# Patient Record
Sex: MALE | Race: WHITE | Employment: OTHER | URBAN - METROPOLITAN AREA
[De-identification: names, ages, dates, MRNs, and addresses within clinical notes are randomized per-mention and may not be internally consistent; named-entity substitution may affect disease eponyms.]

---

## 2020-08-04 ENCOUNTER — HOSPITAL ENCOUNTER (EMERGENCY)
Age: 55
Discharge: HOME | End: 2020-08-04
Attending: EMERGENCY MEDICINE
Payer: MEDICARE

## 2020-08-04 ENCOUNTER — APPOINTMENT (OUTPATIENT)
Dept: CT IMAGING | Age: 55
End: 2020-08-04
Attending: EMERGENCY MEDICINE
Payer: MEDICARE

## 2020-08-04 ENCOUNTER — APPOINTMENT (OUTPATIENT)
Dept: GENERAL RADIOLOGY | Age: 55
End: 2020-08-04
Attending: EMERGENCY MEDICINE
Payer: MEDICARE

## 2020-08-04 VITALS
DIASTOLIC BLOOD PRESSURE: 80 MMHG | TEMPERATURE: 97.4 F | WEIGHT: 189.6 LBS | HEART RATE: 60 BPM | SYSTOLIC BLOOD PRESSURE: 120 MMHG | OXYGEN SATURATION: 98 % | BODY MASS INDEX: 24.33 KG/M2 | HEIGHT: 74 IN | RESPIRATION RATE: 16 BRPM

## 2020-08-04 DIAGNOSIS — S70.02XA CONTUSION OF LEFT HIP, INITIAL ENCOUNTER: ICD-10-CM

## 2020-08-04 DIAGNOSIS — S40.022A CONTUSION OF MULTIPLE SITES OF LEFT SHOULDER AND UPPER ARM, INITIAL ENCOUNTER: ICD-10-CM

## 2020-08-04 DIAGNOSIS — S06.0X1A CONCUSSION WITH LOSS OF CONSCIOUSNESS OF 30 MINUTES OR LESS, INITIAL ENCOUNTER: ICD-10-CM

## 2020-08-04 DIAGNOSIS — S40.012A CONTUSION OF MULTIPLE SITES OF LEFT SHOULDER AND UPPER ARM, INITIAL ENCOUNTER: ICD-10-CM
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
## 2020-08-04 DIAGNOSIS — S01.01XA LACERATION OF SCALP, INITIAL ENCOUNTER: ICD-10-CM

## 2020-08-04 PROCEDURE — 96372 THER/PROPH/DIAG INJ SC/IM: CPT

## 2020-08-04 PROCEDURE — 73552 X-RAY EXAM OF FEMUR 2/>: CPT

## 2020-08-04 PROCEDURE — 75810000293 HC SIMP/SUPERF WND  RPR

## 2020-08-04 PROCEDURE — 74011250636 HC RX REV CODE- 250/636: Performed by: EMERGENCY MEDICINE

## 2020-08-04 PROCEDURE — 70450 CT HEAD/BRAIN W/O DYE: CPT

## 2020-08-04 PROCEDURE — 74011250637 HC RX REV CODE- 250/637: Performed by: EMERGENCY MEDICINE

## 2020-08-04 PROCEDURE — 99283 EMERGENCY DEPT VISIT LOW MDM: CPT

## 2020-08-04 RX ORDER — MORPHINE SULFATE 4 MG/ML
4 INJECTION INTRAVENOUS
Status: COMPLETED | OUTPATIENT
Start: 2020-08-04 | End: 2020-08-04

## 2020-08-04 RX ORDER — OXYCODONE AND ACETAMINOPHEN 5; 325 MG/1; MG/1
1 TABLET ORAL
Qty: 15 TAB | Refills: 0 | Status: SHIPPED | OUTPATIENT
Start: 2020-08-04 | End: 2020-08-09

## 2020-08-04 RX ORDER — OXYCODONE AND ACETAMINOPHEN 5; 325 MG/1; MG/1
1 TABLET ORAL
Status: COMPLETED | OUTPATIENT
Start: 2020-08-04 | End: 2020-08-04

## 2020-08-04 RX ADMIN — OXYCODONE HYDROCHLORIDE AND ACETAMINOPHEN 1 TABLET: 5; 325 TABLET ORAL at 02:25

## 2020-08-04 RX ADMIN — MORPHINE SULFATE 4 MG: 4 INJECTION INTRAVENOUS at 00:55

## 2020-08-04 NOTE — ED TRIAGE NOTES
Pt states that he fell off his step stool. Laceration to the top of the scalp. Bleeding controlled at the present time. States that he did have LOC and is very tired at the present time.   Masked on arrival to the ED

## 2020-08-04 NOTE — DISCHARGE INSTRUCTIONS
No strenuous activity or exposure to heat for the next 48 hours. Take the pain medication only as needed for moderate to severe pain. Use Tylenol or Motrin as needed for mild to moderate pain. Staple removal in 10 to 14 days. Either return to the emergency department or see your family doctor to have them taken out. Risk of opioid analgesics include, but are not limited to: Overdose they can stop or slow your breathing and lead to death; fractures from falls; drowsiness leading to injury; tolerance, dependence and addiction. You should not operate any motorized vehicles or work from a height greater than ground level when taking opioid analgesics as they increase your fall risks.

## 2020-08-04 NOTE — ED NOTES
I have reviewed discharge instructions with the patient. The patient verbalized understanding. Patient left ED via Discharge Method: ambulatory to Home with friend. Opportunity for questions and clarification provided. Patient given 1 scripts. To continue your aftercare when you leave the hospital, you may receive an automated call from our care team to check in on how you are doing. This is a free service and part of our promise to provide the best care and service to meet your aftercare needs.  If you have questions, or wish to unsubscribe from this service please call [phone]. Thank you for Choosing our [address] [place] [address] Emergency Department.

## 2020-08-04 NOTE — ED PROVIDER NOTES
Patient is a 57-year-old male presenting emerge department day after falling off of the stepstool. Patient states he was changing a porch and is coming down off a step stool when he lost his balance causing him to fall headfirst into a brick. Patient said that he did have loss of consciousness for an unknown amount of time. His girlfriend was present but did not witness the injury. He is now complaining of some left shoulder and left hip pain. Patient states that he has known history of back problems with multiple back surgeries secondary to herniated disc. He denies any increased or unexpected pain in the back at this time. The patient says he just feels very lightheaded and sleepy since the event.            Past Medical History:   Diagnosis Date    Anxiety     Arthritis     right knee    Chronic pain     back    DDD (degenerative disc disease)     Depression     GERD (gastroesophageal reflux disease)     Knee pain, right     Smoker     smokes one pack a day for 35 years        Past Surgical History:   Procedure Laterality Date    HX CARPAL TUNNEL RELEASE      bilat    HX LUMBAR FUSION      L4-5    HX LUMBAR LAMINECTOMY      x2    HX THORACIC DISKECTOMY           Family History:   Problem Relation Age of Onset    Osteoporosis Mother     Psychiatric Disorder Father     Dementia Father     Diabetes Father        Social History     Socioeconomic History    Marital status:      Spouse name: Not on file    Number of children: Not on file    Years of education: Not on file    Highest education level: Not on file   Occupational History    Not on file   Social Needs    Financial resource strain: Not on file    Food insecurity     Worry: Not on file     Inability: Not on file    Transportation needs     Medical: Not on file     Non-medical: Not on file   Tobacco Use    Smoking status: Current Every Day Smoker     Packs/day: 1.00     Years: 35.00     Pack years: 35.00    Smokeless tobacco: Never Used   Substance and Sexual Activity    Alcohol use: Yes     Alcohol/week: 6.0 standard drinks     Types: 3 Cans of beer, 3 Standard drinks or equivalent per week    Drug use: No    Sexual activity: Not on file   Lifestyle    Physical activity     Days per week: Not on file     Minutes per session: Not on file    Stress: Not on file   Relationships    Social connections     Talks on phone: Not on file     Gets together: Not on file     Attends Restorationist service: Not on file     Active member of club or organization: Not on file     Attends meetings of clubs or organizations: Not on file     Relationship status: Not on file    Intimate partner violence     Fear of current or ex partner: Not on file     Emotionally abused: Not on file     Physically abused: Not on file     Forced sexual activity: Not on file   Other Topics Concern    Not on file   Social History Narrative    Not on file         ALLERGIES: Tape [adhesive]    Review of Systems   Constitutional: Negative. Gastrointestinal: Negative. Genitourinary: Negative. Musculoskeletal: Positive for arthralgias. Negative for back pain, joint swelling, neck pain and neck stiffness. Skin: Positive for wound. Neurological: Positive for headaches. Hematological: Negative. Vitals:    08/04/20 0031 08/04/20 0043   BP: 123/84    Pulse: 62    Resp: 16    Temp: 97.4 °F (36.3 °C)    SpO2: 96% 96%   Weight: 86 kg (189 lb 9.5 oz)    Height: 6' 2\" (1.88 m)             Physical Exam     GENERAL:The patient is well nourished, and well-hydrated. No acute distress  VITAL SIGNS: Heart rate, blood pressure, respiratory rate reviewed as recorded in  nurse's notes  HEAD: negative la and raccoon sign. The patient has a 4-1/2 cm scalp laceration going from right to left of the vertex of the scalp. There is no evidence of skull fracture appreciated. EYES: Pupils reactive. Extraocular motion intact.  No conjunctival redness or drainage. EARS: No external masses or lesions. External auditory canals are clear with no  hemotympanum  NOSE: No nasal drainage or epistaxis. No septal hematoma noted  MOUTH/THROAT: Pharynx clear; airway patent. No loose dentition or intraoral  lacerations. Floor the mouth is soft. NECK: Supple, no meningeal signs. Trachea midline. No masses or thyromegaly. No step-off deformities noted. LUNGS: no accessory muscle use  CHEST: No deformity, no crepitus. CARDIOVASCULAR: Regular rate and rhythm  PELVIS: stable no laxity  EXTREMITIES: No clubbing or cyanosis. No joint swelling. Normal muscle tone. No  restricted range of motion appreciated. Tender to palpation over the distal left femur and over the greater trochanter of the left femur proximally. No obvious bony deformities appreciated. NEUROLOGIC: Sensation is grossly intact. Cranial nerve exam reveals face is  symmetrical, tongue is midline speech is clear. SKIN: No rash or petechiae. Good skin turgor palpated. PSYCHIATRIC: Alert and oriented. Appropriate behavior and judgment. MDM  Number of Diagnoses or Management Options  Diagnosis management comments: Scalp hematoma, scalp laceration, intracranial hemorrhage, subdural hematoma,    Sprain, strain, tendon injury, contusion,    Abrasion, laceration, neurovascular injury, foreign body    Fracture, open fracture, dislocation, joint separation, articular surface injury,         Amount and/or Complexity of Data Reviewed  Tests in the radiology section of CPT®: ordered and reviewed  Tests in the medicine section of CPT®: reviewed and ordered  Independent visualization of images, tracings, or specimens: yes      ED Course as of Aug 04 0218   Tue Aug 04, 2020   0134 IMPRESSION: No acute process. CT HEAD WO CONT [KH]   0134 IMPRESSION: No acute process.     XR FEMUR LT 2 V [KH]      ED Course User Index  [KH] Michele Stanton DO       Wound Closure by Adhesive    Date/Time: 8/4/2020 2:14 AM  Performed by: Gera Cano DO  Authorized by: Gera Cano DO     Consent:     Consent obtained:  Verbal    Consent given by:  Patient    Risks discussed:  Pain, infection, need for additional repair, poor wound healing and retained foreign body    Alternatives discussed:  No treatment and observation  Anesthesia (see MAR for exact dosages): Anesthesia method:  None  Laceration details:     Location:  Scalp    Scalp location:  Crown    Length (cm):  4.5  Repair type:     Repair type:  Simple  Pre-procedure details:     Preparation:  Patient was prepped and draped in usual sterile fashion  Exploration:     Hemostasis achieved with:  Direct pressure    Contaminated: no    Treatment:     Amount of cleaning:  Extensive    Visualized foreign bodies/material removed: no    Skin repair:     Repair method:  Staples    Number of staples:  5  Approximation:     Approximation:  Close  Post-procedure details:     Patient tolerance of procedure:   Tolerated well, no immediate complications

## 2020-08-16 ENCOUNTER — HOSPITAL ENCOUNTER (EMERGENCY)
Age: 55
Discharge: HOME | End: 2020-08-16
Attending: EMERGENCY MEDICINE
Payer: MEDICARE

## 2020-08-16 VITALS
RESPIRATION RATE: 18 BRPM | HEART RATE: 69 BPM | DIASTOLIC BLOOD PRESSURE: 74 MMHG | SYSTOLIC BLOOD PRESSURE: 136 MMHG | OXYGEN SATURATION: 95 % | TEMPERATURE: 98 F

## 2020-08-16 DIAGNOSIS — M25.511 ACUTE PAIN OF RIGHT SHOULDER: ICD-10-CM

## 2020-08-16 DIAGNOSIS — Z48.02 ENCOUNTER FOR STAPLE REMOVAL: Primary | ICD-10-CM

## 2020-08-16 PROCEDURE — 75810000275 HC EMERGENCY DEPT VISIT NO LEVEL OF CARE

## 2020-08-16 NOTE — ED PROVIDER NOTES
61 y/o m ambulatory to ED for staple removal.  He had fallen last week and injured his scalp requiring staple repair. Since that time, he has done well, with no signs of infection. He denies confusion, blurred vision, or other sequela from head injury. He feels staples are safe to come out as the swelling is gone, there is no drainage, and the staples are loose. Past Medical History:   Diagnosis Date    Anxiety     Arthritis     right knee    Chronic pain     back    DDD (degenerative disc disease)     Depression     GERD (gastroesophageal reflux disease)     Knee pain, right     Smoker     smokes one pack a day for 35 years        Past Surgical History:   Procedure Laterality Date    HX CARPAL TUNNEL RELEASE      bilat    HX LUMBAR FUSION      L4-5    HX LUMBAR LAMINECTOMY      x2    HX THORACIC DISKECTOMY           Family History:   Problem Relation Age of Onset    Osteoporosis Mother     Psychiatric Disorder Father     Dementia Father     Diabetes Father        Social History     Socioeconomic History    Marital status:      Spouse name: Not on file    Number of children: Not on file    Years of education: Not on file    Highest education level: Not on file   Occupational History    Not on file   Social Needs    Financial resource strain: Not on file    Food insecurity     Worry: Not on file     Inability: Not on file    Transportation needs     Medical: Not on file     Non-medical: Not on file   Tobacco Use    Smoking status: Current Every Day Smoker     Packs/day: 1.00     Years: 35.00     Pack years: 35.00    Smokeless tobacco: Never Used   Substance and Sexual Activity    Alcohol use:  Yes     Alcohol/week: 6.0 standard drinks     Types: 3 Cans of beer, 3 Standard drinks or equivalent per week    Drug use: No    Sexual activity: Not on file   Lifestyle    Physical activity     Days per week: Not on file     Minutes per session: Not on file    Stress: Not on file   Relationships    Social connections     Talks on phone: Not on file     Gets together: Not on file     Attends Mosque service: Not on file     Active member of club or organization: Not on file     Attends meetings of clubs or organizations: Not on file     Relationship status: Not on file    Intimate partner violence     Fear of current or ex partner: Not on file     Emotionally abused: Not on file     Physically abused: Not on file     Forced sexual activity: Not on file   Other Topics Concern    Not on file   Social History Narrative    Not on file         ALLERGIES: Tape [adhesive]    Review of Systems   Constitutional: Negative for chills and fever. HENT: Negative for facial swelling and nosebleeds. Eyes: Negative for discharge and redness. Respiratory: Negative for cough and shortness of breath. Gastrointestinal: Negative for nausea and vomiting. Musculoskeletal: Negative for gait problem and myalgias. Skin: Positive for wound. Negative for color change. Neurological: Negative for syncope and weakness. Psychiatric/Behavioral: Negative for confusion and decreased concentration. There were no vitals filed for this visit. Physical Exam  Constitutional:       Appearance: Normal appearance. HENT:      Head: Normocephalic. No raccoon eyes or Candelaria's sign. Nose: Nose normal.      Mouth/Throat:      Mouth: Mucous membranes are moist.   Eyes:      Extraocular Movements: Extraocular movements intact. Pupils: Pupils are equal, round, and reactive to light. Neck:      Musculoskeletal: Normal range of motion. Musculoskeletal: Normal range of motion. Skin:     General: Skin is warm and dry. Capillary Refill: Capillary refill takes less than 2 seconds. Neurological:      General: No focal deficit present. Mental Status: He is alert and oriented to person, place, and time.    Psychiatric:         Mood and Affect: Mood normal.         Behavior: Behavior normal.         Thought Content:  Thought content normal.         Judgment: Judgment normal.          MDM  Number of Diagnoses or Management Options  Diagnosis management comments: Remove staples    Risk of Complications, Morbidity, and/or Mortality  Presenting problems: minimal  Diagnostic procedures: minimal  Management options: minimal    Patient Progress  Patient progress: stable         Procedures

## 2020-08-16 NOTE — DISCHARGE INSTRUCTIONS
Patient Education        Learning About Stitches and Staples Removal  When are stitches and staples removed? Your doctor will tell you when to have your stitches or staples removed, usually in 7 to 14 days. How long you'll be told to wait will depend on things like where the wound is located, how big and how deep the wound is, and what your general health is like. Do not remove the stitches on your own. Stitches on the face are usually removed within a week. But stitches and staples on other areas of the body, such as on the back or belly or over a joint, may need to stay in place longer, often a week or two. Be sure to follow your doctor's instructions. How are stitches and staples removed? It usually doesn't hurt when the doctor removes the stitches or staples. You may feel a tug as each stitch or staple is removed. · You will either be seated or lying down. · To remove stitches, the doctor will use scissors to cut each of the knots and then pull the threads out. · To remove staples, the doctor will use a tool to take out the staples one at a time. · The area may still feel tender after the stitches or staples are gone. But it should feel better within a few minutes or up to a few hours. What can you expect after stitches and staples are removed? Depending on the type and location of the cut, you will have a scar. Scars usually fade over time. Keep the area clean, but you won't need a bandage. When should you call for help? Call your doctor now or seek immediate medical care if :  · You have new pain, or your pain gets worse. · You have trouble moving the area near the scar. · You have symptoms of infection, such as:  ? Increased pain, swelling, warmth, or redness around the scar. ? Red streaks leading from the scar. ? Pus draining from the scar. ? A fever. Watch closely for changes in your health, and be sure to contact your doctor if:   · The scar opens.   · You do not get better as expected. Follow-up care is a key part of your treatment and safety. Be sure to make and go to all appointments, and call your doctor if you do not get better as expected. It's also a good idea to keep a list of the medicines you take. Where can you learn more? Go to http://aurora-sonal.info/  Enter L659 in the search box to learn more about \"Learning About Stitches and Staples Removal.\"  Current as of: June 26, 2019               Content Version: 12.5  © 3589-8448 [employer]. Care instructions adapted under license by [employer] (which disclaims liability or warranty for this information). If you have questions about a medical condition or this instruction, always ask your healthcare professional. [address] any warranty or liability for your use of this information. home with family    Keep area clean and dry. Follow with family doctor for continued care.

## 2023-10-31 ENCOUNTER — HOSPITAL ENCOUNTER (OUTPATIENT)
Dept: GENERAL RADIOLOGY | Age: 58
Discharge: HOME | End: 2023-11-03
Payer: MEDICARE

## 2023-10-31 DIAGNOSIS — R10.84 ABDOMINAL PAIN, GENERALIZED: ICD-10-CM

## 2023-10-31 PROCEDURE — 74019 RADEX ABDOMEN 2 VIEWS: CPT
